# Patient Record
Sex: FEMALE | Race: WHITE | NOT HISPANIC OR LATINO
[De-identification: names, ages, dates, MRNs, and addresses within clinical notes are randomized per-mention and may not be internally consistent; named-entity substitution may affect disease eponyms.]

---

## 2023-08-24 PROBLEM — Z00.00 ENCOUNTER FOR PREVENTIVE HEALTH EXAMINATION: Status: ACTIVE | Noted: 2023-08-24

## 2023-08-25 ENCOUNTER — APPOINTMENT (OUTPATIENT)
Dept: PEDIATRIC CARDIOLOGY | Facility: CLINIC | Age: 33
End: 2023-08-25
Payer: COMMERCIAL

## 2023-08-25 PROCEDURE — 76827 ECHO EXAM OF FETAL HEART: CPT

## 2023-08-25 PROCEDURE — 76820 UMBILICAL ARTERY ECHO: CPT

## 2023-08-25 PROCEDURE — 99202 OFFICE O/P NEW SF 15 MIN: CPT | Mod: 25

## 2023-08-25 PROCEDURE — 76825 ECHO EXAM OF FETAL HEART: CPT

## 2023-09-27 ENCOUNTER — APPOINTMENT (OUTPATIENT)
Dept: PEDIATRIC CARDIOLOGY | Facility: CLINIC | Age: 33
End: 2023-09-27
Payer: COMMERCIAL

## 2023-09-27 PROCEDURE — 99203 OFFICE O/P NEW LOW 30 MIN: CPT | Mod: 25

## 2023-09-27 PROCEDURE — 76820 UMBILICAL ARTERY ECHO: CPT | Mod: 26

## 2023-09-27 PROCEDURE — 99213 OFFICE O/P EST LOW 20 MIN: CPT | Mod: 25

## 2023-09-27 PROCEDURE — 76827 ECHO EXAM OF FETAL HEART: CPT

## 2023-09-27 PROCEDURE — 76821 MIDDLE CEREBRAL ARTERY ECHO: CPT | Mod: 26

## 2023-09-27 PROCEDURE — 76825 ECHO EXAM OF FETAL HEART: CPT

## 2023-12-07 ENCOUNTER — TRANSCRIPTION ENCOUNTER (OUTPATIENT)
Age: 33
End: 2023-12-07

## 2023-12-07 ENCOUNTER — RESULT REVIEW (OUTPATIENT)
Age: 33
End: 2023-12-07

## 2023-12-11 ENCOUNTER — TRANSCRIPTION ENCOUNTER (OUTPATIENT)
Age: 33
End: 2023-12-11

## 2024-01-04 ENCOUNTER — APPOINTMENT (OUTPATIENT)
Dept: HEART AND VASCULAR | Facility: CLINIC | Age: 34
End: 2024-01-04
Payer: COMMERCIAL

## 2024-01-04 ENCOUNTER — NON-APPOINTMENT (OUTPATIENT)
Age: 34
End: 2024-01-04

## 2024-01-04 VITALS
HEART RATE: 76 BPM | SYSTOLIC BLOOD PRESSURE: 126 MMHG | WEIGHT: 150.38 LBS | OXYGEN SATURATION: 98 % | DIASTOLIC BLOOD PRESSURE: 84 MMHG | RESPIRATION RATE: 18 BRPM | BODY MASS INDEX: 30.32 KG/M2 | HEIGHT: 59 IN | TEMPERATURE: 97.7 F

## 2024-01-04 DIAGNOSIS — Z78.9 OTHER SPECIFIED HEALTH STATUS: ICD-10-CM

## 2024-01-04 DIAGNOSIS — Z82.49 FAMILY HISTORY OF ISCHEMIC HEART DISEASE AND OTHER DISEASES OF THE CIRCULATORY SYSTEM: ICD-10-CM

## 2024-01-04 DIAGNOSIS — O10.919 UNSPECIFIED PRE-EXISTING HYPERTENSION COMPLICATING PREGNANCY, UNSPECIFIED TRIMESTER: ICD-10-CM

## 2024-01-04 DIAGNOSIS — Z98.891 HISTORY OF UTERINE SCAR FROM PREVIOUS SURGERY: ICD-10-CM

## 2024-01-04 PROCEDURE — 99214 OFFICE O/P EST MOD 30 MIN: CPT | Mod: 25

## 2024-01-04 PROCEDURE — 93000 ELECTROCARDIOGRAM COMPLETE: CPT

## 2024-01-04 PROCEDURE — 99204 OFFICE O/P NEW MOD 45 MIN: CPT | Mod: 25

## 2024-01-04 RX ORDER — NIFEDIPINE 90 MG
90 TABLET, EXTENDED RELEASE ORAL DAILY
Refills: 0 | Status: ACTIVE | COMMUNITY

## 2024-01-04 RX ORDER — LABETALOL HYDROCHLORIDE 200 MG/1
200 TABLET, FILM COATED ORAL
Refills: 0 | Status: ACTIVE | COMMUNITY

## 2024-01-04 NOTE — DISCUSSION/SUMMARY
[FreeTextEntry1] : 34 YO F with HTN since the second pregnancy who is on labetalol and CCB with controlled BP now. She has no toxic habits. No family Hx of early HTN in family. She was hold that she has chronic low K. Has done some W/U in the past at [facility] including MRI... and has seen endo but was not told regarding the results. She has no CP/SOB/palpitations. EKG with non specific ST-T abnormalities   Will get the records for secondary HTN W/U and see what W/U has been done  R/O for hyperaldosteronism  TTE

## 2024-01-04 NOTE — HISTORY OF PRESENT ILLNESS
[FreeTextEntry1] : 34 YO F with HTN since the second pregnancy who is on labetalol and CCB with controlled BP now. She has no toxic habits. No family Hx of early HTN in family. She was hold that she has chronic low K. Has done some W/U in the past at [facility] including MRI... and has seen endo but was not told regarding the results. She has no CP/SOB/palpitations. EKG with non specific ST-T abnormalities

## 2024-02-21 ENCOUNTER — APPOINTMENT (OUTPATIENT)
Dept: CARDIOLOGY | Facility: CLINIC | Age: 34
End: 2024-02-21
Payer: COMMERCIAL

## 2024-02-21 PROCEDURE — 93306 TTE W/DOPPLER COMPLETE: CPT

## 2024-03-07 ENCOUNTER — APPOINTMENT (OUTPATIENT)
Dept: HEART AND VASCULAR | Facility: CLINIC | Age: 34
End: 2024-03-07
Payer: COMMERCIAL

## 2024-03-07 ENCOUNTER — NON-APPOINTMENT (OUTPATIENT)
Age: 34
End: 2024-03-07

## 2024-03-07 VITALS
DIASTOLIC BLOOD PRESSURE: 86 MMHG | OXYGEN SATURATION: 97 % | WEIGHT: 153.5 LBS | RESPIRATION RATE: 18 BRPM | SYSTOLIC BLOOD PRESSURE: 134 MMHG | TEMPERATURE: 97.7 F | BODY MASS INDEX: 30.95 KG/M2 | HEART RATE: 76 BPM | HEIGHT: 59 IN

## 2024-03-07 VITALS — SYSTOLIC BLOOD PRESSURE: 136 MMHG | DIASTOLIC BLOOD PRESSURE: 94 MMHG

## 2024-03-07 VITALS — SYSTOLIC BLOOD PRESSURE: 138 MMHG | DIASTOLIC BLOOD PRESSURE: 99 MMHG

## 2024-03-07 PROCEDURE — 93000 ELECTROCARDIOGRAM COMPLETE: CPT

## 2024-03-07 PROCEDURE — 99214 OFFICE O/P EST MOD 30 MIN: CPT | Mod: 25

## 2024-03-07 NOTE — DISCUSSION/SUMMARY
[EKG obtained to assist in diagnosis and management of assessed problem(s)] : EKG obtained to assist in diagnosis and management of assessed problem(s) [FreeTextEntry1] : 32 YO F with HTN since the second pregnancy who is on labetalol and CCB with controlled BP now. She has no toxic habits. No family Hx of early HTN in family. She was hold that she has chronic low K. Has done some W/U in the past at [facility] including MRI... and has seen endo but was not told regarding the results. She has no CP/SOB/palpitations. EKG with non specific ST-T abnormalities   Secondary HTN W/U sent  Pulses are symetrical Lt and right. No BP difference

## 2024-03-07 NOTE — PHYSICAL EXAM
[Well Developed] : well developed [Well Nourished] : well nourished [Normal Conjunctiva] : normal conjunctiva [No Acute Distress] : no acute distress [Normal Venous Pressure] : normal venous pressure [No Carotid Bruit] : no carotid bruit [Normal S1, S2] : normal S1, S2 [No Murmur] : no murmur [No Rub] : no rub [No Gallop] : no gallop [Clear Lung Fields] : clear lung fields [No Respiratory Distress] : no respiratory distress  [Good Air Entry] : good air entry [Soft] : abdomen soft [Non Tender] : non-tender [No Masses/organomegaly] : no masses/organomegaly [Normal Bowel Sounds] : normal bowel sounds [Normal Gait] : normal gait [No Edema] : no edema [No Cyanosis] : no cyanosis [No Clubbing] : no clubbing [No Varicosities] : no varicosities [No Rash] : no rash [No Skin Lesions] : no skin lesions [Moves all extremities] : moves all extremities [No Focal Deficits] : no focal deficits [Alert and Oriented] : alert and oriented [Normal Speech] : normal speech [Normal memory] : normal memory

## 2024-03-07 NOTE — HISTORY OF PRESENT ILLNESS
[FreeTextEntry1] : 32 YO F with HTN since the second pregnancy who is on labetalol and CCB with controlled BP now. She has no toxic habits. No family Hx of early HTN in family. She was hold that she has chronic low K. Has done some W/U in the past at [facility] including MRI... and has seen endo but was not told regarding the results. She has no CP/SOB/palpitations. EKG with non specific ST-T abnormalities   3/7/24 BP well controlled. Asymptomatic. EKG unremarkable. Does not have her prior records. TTE: unremarkable

## 2024-03-08 LAB — TSH SERPL-ACNC: 1.11 UIU/ML

## 2024-03-11 LAB
ALDOSTERONE SERUM: 72.4 NG/DL
CA-I SERPL-SCNC: 5 MG/DL

## 2024-03-13 LAB — PTH RELATED PROT SERPL-MCNC: <2 PMOL/L

## 2024-03-14 ENCOUNTER — APPOINTMENT (OUTPATIENT)
Dept: CARDIOLOGY | Facility: CLINIC | Age: 34
End: 2024-03-14

## 2024-03-14 LAB
CORTICOSTEROID BIND GLOBULIN: 3.2 MG/DL
CORTIS SERPL-MCNC: 8.1 UG/DL
CORTISOL, FREE: 0.2 UG/DL
PFCX: 2.5 %

## 2024-03-15 ENCOUNTER — LABORATORY RESULT (OUTPATIENT)
Age: 34
End: 2024-03-15

## 2024-03-15 LAB
METANEPHRINE, PL: <25 PG/ML
NORMETANEPHRINE, PL: 104.7 PG/ML

## 2024-07-31 ENCOUNTER — NON-APPOINTMENT (OUTPATIENT)
Age: 34
End: 2024-07-31

## 2024-09-06 RX ORDER — NIFEDIPINE 90 MG/1
90 TABLET, EXTENDED RELEASE ORAL DAILY
Qty: 30 | Refills: 5 | Status: ACTIVE | COMMUNITY
Start: 2024-09-06 | End: 1900-01-01

## 2025-04-24 ENCOUNTER — RX RENEWAL (OUTPATIENT)
Age: 35
End: 2025-04-24

## 2025-05-07 ENCOUNTER — RX RENEWAL (OUTPATIENT)
Age: 35
End: 2025-05-07

## 2025-05-08 ENCOUNTER — RX RENEWAL (OUTPATIENT)
Age: 35
End: 2025-05-08

## 2025-07-29 ENCOUNTER — RX RENEWAL (OUTPATIENT)
Age: 35
End: 2025-07-29

## 2025-08-01 ENCOUNTER — NON-APPOINTMENT (OUTPATIENT)
Age: 35
End: 2025-08-01

## 2025-08-01 ENCOUNTER — APPOINTMENT (OUTPATIENT)
Dept: HEART AND VASCULAR | Facility: CLINIC | Age: 35
End: 2025-08-01
Payer: COMMERCIAL

## 2025-08-01 VITALS
OXYGEN SATURATION: 97 % | SYSTOLIC BLOOD PRESSURE: 111 MMHG | BODY MASS INDEX: 31.85 KG/M2 | DIASTOLIC BLOOD PRESSURE: 80 MMHG | HEART RATE: 95 BPM | HEIGHT: 59 IN | WEIGHT: 158 LBS

## 2025-08-01 DIAGNOSIS — R06.09 OTHER FORMS OF DYSPNEA: ICD-10-CM

## 2025-08-01 PROCEDURE — 93000 ELECTROCARDIOGRAM COMPLETE: CPT

## 2025-08-01 PROCEDURE — 99214 OFFICE O/P EST MOD 30 MIN: CPT | Mod: 25

## 2025-08-02 ENCOUNTER — TRANSCRIPTION ENCOUNTER (OUTPATIENT)
Age: 35
End: 2025-08-02

## 2025-08-04 LAB
ANION GAP SERPL CALC-SCNC: 17 MMOL/L
BUN SERPL-MCNC: 12 MG/DL
CALCIUM SERPL-MCNC: 9.1 MG/DL
CHLORIDE SERPL-SCNC: 105 MMOL/L
CHOLEST SERPL-MCNC: 178 MG/DL
CO2 SERPL-SCNC: 17 MMOL/L
CREAT SERPL-MCNC: 0.54 MG/DL
EGFRCR SERPLBLD CKD-EPI 2021: 123 ML/MIN/1.73M2
GLUCOSE SERPL-MCNC: 101 MG/DL
HDLC SERPL-MCNC: 49 MG/DL
LDLC SERPL-MCNC: 115 MG/DL
NONHDLC SERPL-MCNC: 129 MG/DL
NT-PROBNP SERPL-MCNC: 146 PG/ML
POTASSIUM SERPL-SCNC: 3.5 MMOL/L
SODIUM SERPL-SCNC: 140 MMOL/L
TRIGL SERPL-MCNC: 78 MG/DL

## 2025-09-10 ENCOUNTER — APPOINTMENT (OUTPATIENT)
Dept: CARDIOLOGY | Facility: CLINIC | Age: 35
End: 2025-09-10

## 2025-09-17 ENCOUNTER — APPOINTMENT (OUTPATIENT)
Dept: CARDIOLOGY | Facility: CLINIC | Age: 35
End: 2025-09-17